# Patient Record
Sex: MALE | Race: WHITE | NOT HISPANIC OR LATINO | Employment: FULL TIME | URBAN - METROPOLITAN AREA
[De-identification: names, ages, dates, MRNs, and addresses within clinical notes are randomized per-mention and may not be internally consistent; named-entity substitution may affect disease eponyms.]

---

## 2017-06-14 ENCOUNTER — OFFICE VISIT (OUTPATIENT)
Dept: OPHTHALMOLOGY | Facility: CLINIC | Age: 52
End: 2017-06-14
Payer: COMMERCIAL

## 2017-06-14 DIAGNOSIS — H25.11 SENILE NUCLEAR CATARACT, RIGHT: ICD-10-CM

## 2017-06-14 DIAGNOSIS — H52.7 REFRACTIVE ERROR: ICD-10-CM

## 2017-06-14 DIAGNOSIS — H33.21 OLD RETINAL DETACHMENT OF RIGHT EYE: ICD-10-CM

## 2017-06-14 DIAGNOSIS — H25.041 POSTERIOR SUBCAPSULAR POLAR SENILE CATARACT, RIGHT: Primary | ICD-10-CM

## 2017-06-14 DIAGNOSIS — H25.12 SENILE NUCLEAR SCLEROSIS, LEFT: ICD-10-CM

## 2017-06-14 PROCEDURE — 92014 COMPRE OPH EXAM EST PT 1/>: CPT | Mod: S$GLB,,, | Performed by: OPHTHALMOLOGY

## 2017-06-14 PROCEDURE — 99999 PR PBB SHADOW E&M-EST. PATIENT-LVL I: CPT | Mod: PBBFAC,,, | Performed by: OPHTHALMOLOGY

## 2017-06-14 NOTE — PROGRESS NOTES
HPI     Patient was referred by Dr. Navas for  cataract OD , patient states   overall decline in vision in OD  At distance when driving and at near when   reading patient had an RD repair by Dr. Hogan in 12/15 , patient has a h/o   of scl wear since the 90's and prior to that patient wore rgp from   childhood to age 40+.  Pt currently wears scls daily       NPP-REFERRED BY DR. NAVAS  RD REPAIR OD DR. HOGAN 12/31/15  CAT OD  LAST SAW [facility] [place] 08/25/16  H/O SCL WEAR X 17 + YEARS  H/O RGP WEAR FROM AGE 10-AGE 40+              Last edited by Stepan Small MD on 6/14/2017  3:32 PM. (History)            Assessment /Plan     For exam results, see Encounter Report.      ICD-10-CM ICD-9-CM    1. Posterior subcapsular polar senile cataract, right H25.041 366.14 Visually Significant Cataract OD > OS  Patient reports decreased vision consistent with the clinical amount of lenticular opacity, which reaches the level of visual significance and affects activities of daily living including reading and glare. Risks, benefits, and alternatives to cataract surgery were discussed.   The pt expressed a desire to proceed with surgery with the potential for some reasonable degree of visual improvement.    Discussed IOL options and refractive outcomes for this patient.    Phaco right eye, could do   Topical  to be determined after repear measurements      Pt understands he has to stop all scls wear to the Right eye at this time (may take a few months for cornea to stabilize)     Mora will call pt for next appt to repeat measurements then         Explained that patient may need glasses after surgery.  Discussed that vision may be limited by astigmatism and retina           2. Senile nuclear cataract, right H25.11 366.16 See above   3. Senile nuclear sclerosis, left H25.12 366.16 Follow    4. Old retinal detachment of right eye H33.051 361.07 Followed by Dr. Hogan/Sofia    5. Refractive error H52.7 367.9        RETURN TO CLINIC for repeat  RIGO in 3 weeks, no contact lens wear od at this time

## 2017-07-25 ENCOUNTER — TELEPHONE (OUTPATIENT)
Dept: OPHTHALMOLOGY | Facility: CLINIC | Age: 52
End: 2017-07-25

## 2017-07-25 NOTE — TELEPHONE ENCOUNTER
----- Message from Jessenia Bennett sent at 7/25/2017 11:24 AM CDT -----  Contact: pt  Need to speak with alejandra about schedule an appt for Darrel pls call  [phone]

## 2017-08-08 ENCOUNTER — OFFICE VISIT (OUTPATIENT)
Dept: OPHTHALMOLOGY | Facility: CLINIC | Age: 52
End: 2017-08-08
Payer: COMMERCIAL

## 2017-08-08 DIAGNOSIS — H25.041 POSTERIOR SUBCAPSULAR POLAR SENILE CATARACT, RIGHT: Primary | ICD-10-CM

## 2017-08-08 DIAGNOSIS — H25.12 SENILE NUCLEAR SCLEROSIS, LEFT: ICD-10-CM

## 2017-08-08 PROCEDURE — 92136 OPHTHALMIC BIOMETRY: CPT | Mod: RT,S$GLB,, | Performed by: OPHTHALMOLOGY

## 2017-08-08 PROCEDURE — 99499 UNLISTED E&M SERVICE: CPT | Mod: S$GLB,,, | Performed by: OPHTHALMOLOGY

## 2017-08-08 PROCEDURE — 99999 PR PBB SHADOW E&M-EST. PATIENT-LVL II: CPT | Mod: PBBFAC,,, | Performed by: OPHTHALMOLOGY

## 2017-08-08 RX ORDER — POLYMYXIN B SULFATE AND TRIMETHOPRIM 1; 10000 MG/ML; [USP'U]/ML
1 SOLUTION OPHTHALMIC EVERY 4 HOURS
Qty: 1 BOTTLE | Refills: 1 | Status: SHIPPED | OUTPATIENT
Start: 2017-08-08 | End: 2017-08-18

## 2017-08-08 RX ORDER — DIFLUPREDNATE OPHTHALMIC 0.5 MG/ML
1 EMULSION OPHTHALMIC 4 TIMES DAILY
Qty: 1 BOTTLE | Refills: 1 | Status: SHIPPED | OUTPATIENT
Start: 2017-08-08 | End: 2017-09-07

## 2017-08-08 NOTE — PROGRESS NOTES
HPI     Patient is here for his second set of measurements for phaco od, patient   came in today wearing a scl in OS, discussed lens options with the patient   and he wants standard lens OD  Set for distance.    Last edited by BRENDEN Sánchez on 8/8/2017  2:35 PM. (History)            Assessment /Plan     For exam results, see Encounter Report.      ICD-10-CM ICD-9-CM    1. Posterior subcapsular polar senile cataract, right H25.041 366.14    2. Senile nuclear sclerosis, left H25.12 366.16        Visually Significant Cataract OD > OS  Patient reports decreased vision consistent with the clinical amount of lenticular opacity, which reaches the level of visual significance and affects activities of daily living including reading and glare. Risks, benefits, and alternatives to cataract surgery were discussed.   The pt expressed a desire to proceed with surgery with the potential for some reasonable degree of visual improvement.    Discussed IOL options and refractive outcomes for this patient.    Phaco right eye with Ora.   Topical  monofocal IOL.  Will aim for distance  Referral to [facility] for Ophthalmic surgery  Prescriptions sent for preoperative medications  Durezol, Polytrim, and Ilevro  Explained that patient may need glasses after surgery.  Discussed that vision may be limited by myopia

## 2017-08-18 ENCOUNTER — OFFICE VISIT (OUTPATIENT)
Dept: OPHTHALMOLOGY | Facility: CLINIC | Age: 52
End: 2017-08-18
Payer: COMMERCIAL

## 2017-08-18 DIAGNOSIS — Z98.41 CATARACT EXTRACTION STATUS, RIGHT: ICD-10-CM

## 2017-08-18 DIAGNOSIS — Z98.890 POST-OPERATIVE STATE: Primary | ICD-10-CM

## 2017-08-18 PROCEDURE — 99024 POSTOP FOLLOW-UP VISIT: CPT | Mod: S$GLB,,, | Performed by: OPHTHALMOLOGY

## 2017-08-18 PROCEDURE — 99999 PR PBB SHADOW E&M-EST. PATIENT-LVL I: CPT | Mod: PBBFAC,,, | Performed by: OPHTHALMOLOGY

## 2017-08-18 NOTE — PROGRESS NOTES
Assessment /Plan     For exam results, see Encounter Report.      ICD-10-CM ICD-9-CM    1. Post-operative state Z98.890 V45.89    2. Cataract extraction status, right Z98.41 V45.61        PO Day 1 S/P Phaco/IOL  right eye  Doing well.    Use Durezol QID until 8/24, then TID x next visit   Ilevro daily , stop on 8/27  Polymyxin B 4 x day stop on 8/24  Reinstructed in importance of absolute compliance with Post-OP instructions including medications, shield at bedtime, and limitation of activities. Follow up appointments in approximately two  and six weeks or call immediately for increased pain, redness or vision loss.

## 2017-09-01 ENCOUNTER — OFFICE VISIT (OUTPATIENT)
Dept: OPHTHALMOLOGY | Facility: CLINIC | Age: 52
End: 2017-09-01
Payer: COMMERCIAL

## 2017-09-01 DIAGNOSIS — H25.12 SENILE NUCLEAR SCLEROSIS, LEFT: ICD-10-CM

## 2017-09-01 DIAGNOSIS — Z98.890 POST-OPERATIVE STATE: Primary | ICD-10-CM

## 2017-09-01 DIAGNOSIS — Z98.41 CATARACT EXTRACTION STATUS, RIGHT: ICD-10-CM

## 2017-09-01 PROCEDURE — 99024 POSTOP FOLLOW-UP VISIT: CPT | Mod: S$GLB,,, | Performed by: OPHTHALMOLOGY

## 2017-09-01 PROCEDURE — 99999 PR PBB SHADOW E&M-EST. PATIENT-LVL I: CPT | Mod: PBBFAC,,, | Performed by: OPHTHALMOLOGY

## 2017-09-01 NOTE — PROGRESS NOTES
HPI     Patient returns for a 2 month p.o. Visit patient states his vision is   good.    NPP-REFERRED BY DR. NAVAS  RD REPAIR OD DR. HOGAN 12/31/1  PCIOL OD +10.0 SN60WF / CDE: 26.72 8/17/2017 (changes lens from 10.5 to 10   per ORA)    H/O SCL WEAR X 17 + YEARS  H/O RGP WEAR FROM AGE 10-AGE 40+    OD DUREZOL TID    Last edited by Stepan Small MD on 9/1/2017  8:23 AM. (History)            Assessment /Plan     For exam results, see Encounter Report.      ICD-10-CM ICD-9-CM    1. Post-operative state Z98.890 V45.89 Doing well post Phaco  Taper durezol bid for one week and then once per day for one week   2. Cataract extraction status, right Z98.41 V45.61    3. Senile nuclear sclerosis, left H25.12 366.16 follow       Return to clinic 6 months with [facility] and disp contact lens Rx OS

## 2018-03-01 ENCOUNTER — OFFICE VISIT (OUTPATIENT)
Dept: OPHTHALMOLOGY | Facility: CLINIC | Age: 53
End: 2018-03-01
Payer: COMMERCIAL

## 2018-03-01 DIAGNOSIS — H52.13 MYOPIC ASTIGMATISM OF BOTH EYES: ICD-10-CM

## 2018-03-01 DIAGNOSIS — H01.00A BLEPHARITIS OF UPPER AND LOWER EYELIDS OF BOTH EYES, UNSPECIFIED TYPE: ICD-10-CM

## 2018-03-01 DIAGNOSIS — H52.4 PRESBYOPIA: ICD-10-CM

## 2018-03-01 DIAGNOSIS — H52.203 MYOPIC ASTIGMATISM OF BOTH EYES: ICD-10-CM

## 2018-03-01 DIAGNOSIS — Z96.1 PSEUDOPHAKIA OF RIGHT EYE: Primary | ICD-10-CM

## 2018-03-01 DIAGNOSIS — Z13.5 GLAUCOMA SCREENING: ICD-10-CM

## 2018-03-01 DIAGNOSIS — H01.00B BLEPHARITIS OF UPPER AND LOWER EYELIDS OF BOTH EYES, UNSPECIFIED TYPE: ICD-10-CM

## 2018-03-01 PROCEDURE — 92014 COMPRE OPH EXAM EST PT 1/>: CPT | Mod: S$GLB,,, | Performed by: OPTOMETRIST

## 2018-03-01 PROCEDURE — 99999 PR PBB SHADOW E&M-EST. PATIENT-LVL II: CPT | Mod: PBBFAC,,, | Performed by: OPTOMETRIST

## 2018-03-01 NOTE — PROGRESS NOTES
HPI     Last [facility] exam 09/01.2017  6 Month PCIOL Check  Cataract, nuclear, OS  CL, OS only  No visual complaints  Using OTC Readers +2.50    Last edited by Bryan Estrada MA on 3/1/2018  9:58 AM. (History)            Assessment /Plan     For exam results, see Encounter Report.    Blepharitis of upper and lower eyelids of both eyes, unspecified type  -     tobramycin sulfate 0.3% (TOBREX) 0.3 % ophthalmic ointment; Apply to upper and lower eyelid rims OU as needed per instructions given.  Dispense: 3.5 g; Refill: 2    Pseudophakia of right eye    Glaucoma screening    Myopic astigmatism of both eyes    Presbyopia    Stable PIOL OD.  OS with mild NS = will follow.  Blepharitis OU = ole above.  OH OK OU otherwise.  CL Rx given.  I explained that specs will not work with his current anisometropia.  RTC one year.

## 2019-09-04 ENCOUNTER — TELEPHONE (OUTPATIENT)
Dept: OPHTHALMOLOGY | Facility: CLINIC | Age: 54
End: 2019-09-04

## 2019-09-04 DIAGNOSIS — H01.00B BLEPHARITIS OF UPPER AND LOWER EYELIDS OF BOTH EYES, UNSPECIFIED TYPE: ICD-10-CM

## 2019-09-04 DIAGNOSIS — H01.00A BLEPHARITIS OF UPPER AND LOWER EYELIDS OF BOTH EYES, UNSPECIFIED TYPE: ICD-10-CM

## 2019-09-04 NOTE — TELEPHONE ENCOUNTER
----- Message from Jessenai Bennett sent at 9/4/2019  9:29 AM CDT -----  Contact: pt/[phone]  Needs refill on tobramycin sulfate 0.3% (TOBREX) 0.3 % ophthalmic ointment pls [facility] [address] [facility] [phone]

## 2019-09-06 ENCOUNTER — TELEPHONE (OUTPATIENT)
Dept: OPHTHALMOLOGY | Facility: CLINIC | Age: 54
End: 2019-09-06

## 2019-09-06 NOTE — TELEPHONE ENCOUNTER
----- Message from Jessenia Bennett sent at 9/6/2019 10:40 AM CDT -----  Contact: pt  Would like to consult with nurse regarding medication,  Please call back at [phone]. Thanks/

## 2019-09-06 NOTE — TELEPHONE ENCOUNTER
Spoke with patient informed him refill had been routed to [facility]

## 2019-09-18 ENCOUNTER — TELEPHONE (OUTPATIENT)
Dept: OPHTHALMOLOGY | Facility: CLINIC | Age: 54
End: 2019-09-18

## 2019-09-18 DIAGNOSIS — H01.003 BLEPHARITIS OF BOTH EYES: ICD-10-CM

## 2019-09-18 DIAGNOSIS — H01.006 BLEPHARITIS OF BOTH EYES: ICD-10-CM

## 2019-09-18 NOTE — TELEPHONE ENCOUNTER
----- Message from Angelica Fuentes sent at 9/18/2019  9:20 AM CDT -----  Contact: Pt  Pt is requesting call back in regards to medication.          Pls call back at [phone]

## 2019-09-18 NOTE — TELEPHONE ENCOUNTER
I spoke with Mr. Iverson.  He states Tobrex was called in rather than Tobradex.  States is was also sent to the wrong pharmacy.  He would like Tobradex to be called in to [facility] and [facility].  I pended the prescription for Dr. Wick to sign.

## 2019-09-18 NOTE — TELEPHONE ENCOUNTER
----- Message from Jessenia Bennett sent at 9/18/2019  1:18 PM CDT -----  Contact: pt  Pt is requesting call back in regards to wrong  Medication called in pls advise .[phone]

## 2020-01-28 ENCOUNTER — OFFICE VISIT (OUTPATIENT)
Dept: OPHTHALMOLOGY | Facility: CLINIC | Age: 55
End: 2020-01-28
Payer: COMMERCIAL

## 2020-01-28 DIAGNOSIS — H01.00A BLEPHARITIS OF UPPER AND LOWER EYELIDS OF BOTH EYES, UNSPECIFIED TYPE: ICD-10-CM

## 2020-01-28 DIAGNOSIS — H52.13 MYOPIC ASTIGMATISM OF BOTH EYES: ICD-10-CM

## 2020-01-28 DIAGNOSIS — H52.31 ANISOMETROPIA: ICD-10-CM

## 2020-01-28 DIAGNOSIS — H52.203 MYOPIC ASTIGMATISM OF BOTH EYES: ICD-10-CM

## 2020-01-28 DIAGNOSIS — Z96.1 PSEUDOPHAKIA OF RIGHT EYE: Primary | ICD-10-CM

## 2020-01-28 DIAGNOSIS — H52.4 PRESBYOPIA: ICD-10-CM

## 2020-01-28 DIAGNOSIS — Z13.5 GLAUCOMA SCREENING: ICD-10-CM

## 2020-01-28 DIAGNOSIS — H01.00B BLEPHARITIS OF UPPER AND LOWER EYELIDS OF BOTH EYES, UNSPECIFIED TYPE: ICD-10-CM

## 2020-01-28 PROCEDURE — 92014 PR EYE EXAM, EST PATIENT,COMPREHESV: ICD-10-PCS | Mod: S$GLB,,, | Performed by: OPTOMETRIST

## 2020-01-28 PROCEDURE — 92015 DETERMINE REFRACTIVE STATE: CPT | Mod: S$GLB,,, | Performed by: OPTOMETRIST

## 2020-01-28 PROCEDURE — 92014 COMPRE OPH EXAM EST PT 1/>: CPT | Mod: S$GLB,,, | Performed by: OPTOMETRIST

## 2020-01-28 PROCEDURE — 99999 PR PBB SHADOW E&M-EST. PATIENT-LVL I: ICD-10-PCS | Mod: PBBFAC,,, | Performed by: OPTOMETRIST

## 2020-01-28 PROCEDURE — 92015 PR REFRACTION: ICD-10-PCS | Mod: S$GLB,,, | Performed by: OPTOMETRIST

## 2020-01-28 PROCEDURE — 99999 PR PBB SHADOW E&M-EST. PATIENT-LVL I: CPT | Mod: PBBFAC,,, | Performed by: OPTOMETRIST

## 2020-01-28 NOTE — PROGRESS NOTES
HPI     Last [facility] exam 03/01/2018  Pseudophakia, OD  RD, OD  NS, OS  Update CL Rx uses lens OS only  Uses +2.50 OTC Readers     Last edited by Bryan Estrada MA on 1/28/2020  2:06 PM. (History)            Assessment /Plan     For exam results, see Encounter Report.    Pseudophakia of right eye    Blepharitis of upper and lower eyelids of both eyes, unspecified type    Glaucoma screening    Myopic astigmatism of both eyes    Presbyopia    Anisometropia      Stable PIOL OD.  OS with mild NS = will follow.   OH OK OU otherwise.  CL Rx given.  I explained that specs will not work with his current anisometropia.  RTC one year.

## 2020-12-16 DIAGNOSIS — H01.003 BLEPHARITIS OF BOTH EYES: ICD-10-CM

## 2020-12-16 DIAGNOSIS — H01.006 BLEPHARITIS OF BOTH EYES: ICD-10-CM

## 2020-12-16 NOTE — TELEPHONE ENCOUNTER
----- Message from Jessenia Bennett sent at 12/16/2020  8:46 AM CST -----  Pt need refill on  Tobradex  pls send to pharmacy at [phone] pls call any questions [phone]

## 2021-01-28 ENCOUNTER — OFFICE VISIT (OUTPATIENT)
Dept: OPHTHALMOLOGY | Facility: CLINIC | Age: 56
End: 2021-01-28
Payer: COMMERCIAL

## 2021-01-28 DIAGNOSIS — Z86.69 HISTORY OF RETINAL DETACHMENT: ICD-10-CM

## 2021-01-28 DIAGNOSIS — H52.31 ANISOMETROPIA: ICD-10-CM

## 2021-01-28 DIAGNOSIS — H52.4 PRESBYOPIA: ICD-10-CM

## 2021-01-28 DIAGNOSIS — Z96.1 PSEUDOPHAKIA OF RIGHT EYE: ICD-10-CM

## 2021-01-28 DIAGNOSIS — H52.12 MYOPIA WITH ASTIGMATISM, LEFT: ICD-10-CM

## 2021-01-28 DIAGNOSIS — H52.202 MYOPIA WITH ASTIGMATISM, LEFT: ICD-10-CM

## 2021-01-28 DIAGNOSIS — H25.12 NUCLEAR SCLEROSIS OF LEFT EYE: Primary | ICD-10-CM

## 2021-01-28 PROCEDURE — 99999 PR PBB SHADOW E&M-EST. PATIENT-LVL II: CPT | Mod: PBBFAC,,, | Performed by: OPTOMETRIST

## 2021-01-28 PROCEDURE — 99999 PR PBB SHADOW E&M-EST. PATIENT-LVL II: ICD-10-PCS | Mod: PBBFAC,,, | Performed by: OPTOMETRIST

## 2021-01-28 PROCEDURE — 92015 PR REFRACTION: ICD-10-PCS | Mod: S$GLB,,, | Performed by: OPTOMETRIST

## 2021-01-28 PROCEDURE — 92014 COMPRE OPH EXAM EST PT 1/>: CPT | Mod: S$GLB,,, | Performed by: OPTOMETRIST

## 2021-01-28 PROCEDURE — 92014 PR EYE EXAM, EST PATIENT,COMPREHESV: ICD-10-PCS | Mod: S$GLB,,, | Performed by: OPTOMETRIST

## 2021-01-28 PROCEDURE — 92015 DETERMINE REFRACTIVE STATE: CPT | Mod: S$GLB,,, | Performed by: OPTOMETRIST

## 2022-05-17 ENCOUNTER — OFFICE VISIT (OUTPATIENT)
Dept: OPHTHALMOLOGY | Facility: CLINIC | Age: 57
End: 2022-05-17
Payer: COMMERCIAL

## 2022-05-17 DIAGNOSIS — H52.12 MYOPIA WITH ASTIGMATISM, LEFT: ICD-10-CM

## 2022-05-17 DIAGNOSIS — H52.4 PRESBYOPIA: ICD-10-CM

## 2022-05-17 DIAGNOSIS — H01.021 SQUAMOUS BLEPHARITIS OF UPPER EYELIDS OF BOTH EYES: ICD-10-CM

## 2022-05-17 DIAGNOSIS — Z86.69 HISTORY OF RETINAL DETACHMENT: ICD-10-CM

## 2022-05-17 DIAGNOSIS — H52.31 ANISOMETROPIA: ICD-10-CM

## 2022-05-17 DIAGNOSIS — H01.024 SQUAMOUS BLEPHARITIS OF UPPER EYELIDS OF BOTH EYES: ICD-10-CM

## 2022-05-17 DIAGNOSIS — H52.202 MYOPIA WITH ASTIGMATISM, LEFT: ICD-10-CM

## 2022-05-17 DIAGNOSIS — H25.12 NUCLEAR SCLEROSIS OF LEFT EYE: Primary | ICD-10-CM

## 2022-05-17 DIAGNOSIS — Z96.1 PSEUDOPHAKIA OF RIGHT EYE: ICD-10-CM

## 2022-05-17 PROCEDURE — 4010F PR ACE/ARB THEARPY RXD/TAKEN: ICD-10-PCS | Mod: CPTII,S$GLB,, | Performed by: OPTOMETRIST

## 2022-05-17 PROCEDURE — 1160F RVW MEDS BY RX/DR IN RCRD: CPT | Mod: CPTII,S$GLB,, | Performed by: OPTOMETRIST

## 2022-05-17 PROCEDURE — 92015 PR REFRACTION: ICD-10-PCS | Mod: S$GLB,,, | Performed by: OPTOMETRIST

## 2022-05-17 PROCEDURE — 99999 PR PBB SHADOW E&M-EST. PATIENT-LVL II: CPT | Mod: PBBFAC,,, | Performed by: OPTOMETRIST

## 2022-05-17 PROCEDURE — 92014 COMPRE OPH EXAM EST PT 1/>: CPT | Mod: S$GLB,,, | Performed by: OPTOMETRIST

## 2022-05-17 PROCEDURE — 4010F ACE/ARB THERAPY RXD/TAKEN: CPT | Mod: CPTII,S$GLB,, | Performed by: OPTOMETRIST

## 2022-05-17 PROCEDURE — 99999 PR PBB SHADOW E&M-EST. PATIENT-LVL II: ICD-10-PCS | Mod: PBBFAC,,, | Performed by: OPTOMETRIST

## 2022-05-17 PROCEDURE — 1160F PR REVIEW ALL MEDS BY PRESCRIBER/CLIN PHARMACIST DOCUMENTED: ICD-10-PCS | Mod: CPTII,S$GLB,, | Performed by: OPTOMETRIST

## 2022-05-17 PROCEDURE — 1159F PR MEDICATION LIST DOCUMENTED IN MEDICAL RECORD: ICD-10-PCS | Mod: CPTII,S$GLB,, | Performed by: OPTOMETRIST

## 2022-05-17 PROCEDURE — 92014 PR EYE EXAM, EST PATIENT,COMPREHESV: ICD-10-PCS | Mod: S$GLB,,, | Performed by: OPTOMETRIST

## 2022-05-17 PROCEDURE — 92015 DETERMINE REFRACTIVE STATE: CPT | Mod: S$GLB,,, | Performed by: OPTOMETRIST

## 2022-05-17 PROCEDURE — 1159F MED LIST DOCD IN RCRD: CPT | Mod: CPTII,S$GLB,, | Performed by: OPTOMETRIST

## 2022-05-17 RX ORDER — NEOMYCIN SULFATE, POLYMYXIN B SULFATE, AND DEXAMETHASONE 3.5; 10000; 1 MG/G; [USP'U]/G; MG/G
OINTMENT OPHTHALMIC
Qty: 3.5 G | Refills: 0 | Status: SHIPPED | OUTPATIENT
Start: 2022-05-17

## 2022-05-17 NOTE — PROGRESS NOTES
HPI     Nuclear sclerosis      Comments: Yearly eval NSC OS              Comments     Patient states that he is wearing LCL daily - denies va changes OU -   denies pain/ discomfort OU -  occas OTC tears for dryness       NPP-REFERRED BY DR. NAVAS  RD REPAIR OD DR. HOGAN 12/31/1  PCIOL OD +10.0 SN60WF / CDE: 26.72 8/17/2017 (changes lens from 10.5 to 10   per ORA)    H/O SCL WEAR X 17 + YEARS  H/O RGP WEAR FROM AGE 12-AGE 40+    OD DUREZOL TID            Last edited by Chanelle Barahona MA on 5/17/2022  3:14 PM. (History)            Assessment /Plan     For exam results, see Encounter Report.    Nuclear sclerosis of left eye  Mild with good va  Cat sx not yet indicated  Monitor 12 months    Pseudophakia of right eye  Doing well OD  Monitor    History of retinal detachment  Stable today  No tears, holes or RD in either eye  Monitor 12 months    Anisometropia  Presbyopia  Myopia with astigmatism, left  Eyeglass Final Rx     Eyeglass Final Rx       Sphere Cylinder Axis    Right [place]      Left -8.50 +0.75 175    Expiration Date: 5/17/2023                Squamous blepharitis of upper eyelids of both eyes    Other orders  -     neomycin-polymyxin-dexamethasone (DEXACINE) 3.5 mg/g-10,000 unit/g-0.1 % Oint; Apply to affected once daily  Dispense: 3.5 g; Refill: 0      RTC 1 yr for dilated eye exam or PRN if any problems.   Discussed above and answered questions.

## 2024-04-23 ENCOUNTER — OFFICE VISIT (OUTPATIENT)
Dept: OPHTHALMOLOGY | Facility: CLINIC | Age: 59
End: 2024-04-23
Payer: COMMERCIAL

## 2024-04-23 DIAGNOSIS — H52.12 MYOPIA WITH ASTIGMATISM, LEFT: ICD-10-CM

## 2024-04-23 DIAGNOSIS — H52.31 ANISOMETROPIA: ICD-10-CM

## 2024-04-23 DIAGNOSIS — Z96.1 PSEUDOPHAKIA OF RIGHT EYE: ICD-10-CM

## 2024-04-23 DIAGNOSIS — H26.491 PCO (POSTERIOR CAPSULAR OPACIFICATION), RIGHT: ICD-10-CM

## 2024-04-23 DIAGNOSIS — H25.12 NUCLEAR SCLEROSIS OF LEFT EYE: Primary | ICD-10-CM

## 2024-04-23 DIAGNOSIS — Z86.69 HISTORY OF RETINAL DETACHMENT: ICD-10-CM

## 2024-04-23 DIAGNOSIS — H52.202 MYOPIA WITH ASTIGMATISM, LEFT: ICD-10-CM

## 2024-04-23 DIAGNOSIS — H52.4 PRESBYOPIA: ICD-10-CM

## 2024-04-23 PROCEDURE — 92015 DETERMINE REFRACTIVE STATE: CPT | Mod: ,,, | Performed by: OPTOMETRIST

## 2024-04-23 PROCEDURE — 92310 CONTACT LENS FITTING OU: CPT | Mod: CSM,S$GLB,, | Performed by: OPTOMETRIST

## 2024-04-23 PROCEDURE — 99999 PR PBB SHADOW E&M-EST. PATIENT-LVL II: CPT | Mod: PBBFAC,,, | Performed by: OPTOMETRIST

## 2024-04-23 PROCEDURE — 92014 COMPRE OPH EXAM EST PT 1/>: CPT | Mod: ,,, | Performed by: OPTOMETRIST

## 2024-04-23 RX ORDER — NEOMYCIN SULFATE, POLYMYXIN B SULFATE, AND DEXAMETHASONE 3.5; 10000; 1 MG/G; [USP'U]/G; MG/G
OINTMENT OPHTHALMIC
Qty: 3.5 G | Refills: 0 | Status: SHIPPED | OUTPATIENT
Start: 2024-04-23

## 2024-04-23 NOTE — PROGRESS NOTES
HPI    Patient seeing floaters   in left eye .  Patient was seen by his retina doctor 04/10/2024 and everything was good  Last eye exam 05/17/2022 DNL.  Update glasses RX.    1. RD repair OD DR. Fox 12/31/1  2. PC IOL OD +10.0 SN60WF / CDE: 26.72 8/17/2017 (changes lens from 10.5   to 10 per ORA)  3. H/o SCL wear x 17+ yrs  4. H/o RGP wear from age 10 to 40+      Last edited by Madeleine Bennett MA on 4/23/2024  3:14 PM.            Assessment /Plan     For exam results, see Encounter Report.    Nuclear sclerosis of left eye  Cataracts not significantly affecting activities of daily living and therefore surgery is not indicated at this time.   Will continue to monitor over the next 12 months. Pt to call or RTC with any significant change in vision prior to next visit.     History of retinal detachment  Stable today, no new tears, holes or RD OD, OS  Signs and symptoms of retinal detachment were reviewed with patient  Return to clinic as soon as possible (same day) if you notice any new floaters, flashes of light, curtain/veil over your vision from any direction, or any change in vision.    Pseudophakia of right eye  PCO (posterior capsular opacification), right  Stable OU  Mild PCO OD, YAG not yet indicated  Monitor 12 months    Anisometropia  Myopia with astigmatism, left  Presbyopia  Contact Lens Prescription (4/23/2024)          Brand Base Curve Diameter Sphere Cylinder Axis    Right No Lens         Left Acuvue Oasys for Astigmatism 8.6 14.5 -7.00 -1.25 080      Expiration Date: 4/23/2025    Replacement: Every 2 weeks    Wearing Schedule: Daily Wear          Dispensed trial contact lenses today. Patient is to wear lenses for 1 week.   Ok to order supply if no problems. RTC PRN if any problems arise.    Otherwise, RTC 1 yr for dilated eye exam or PRN if any problems.   Discussed above and answered questions.